# Patient Record
Sex: FEMALE | Race: WHITE | Employment: FULL TIME | URBAN - METROPOLITAN AREA
[De-identification: names, ages, dates, MRNs, and addresses within clinical notes are randomized per-mention and may not be internally consistent; named-entity substitution may affect disease eponyms.]

---

## 2017-02-07 ENCOUNTER — TELEPHONE (OUTPATIENT)
Dept: OBGYN CLINIC | Facility: CLINIC | Age: 37
End: 2017-02-07

## 2017-02-07 DIAGNOSIS — Z30.41 ENCOUNTER FOR BIRTH CONTROL PILLS MAINTENANCE: Primary | ICD-10-CM

## 2017-02-07 RX ORDER — NORGESTIMATE AND ETHINYL ESTRADIOL 7DAYSX3 28
1 KIT ORAL DAILY
Qty: 3 PACKAGE | Refills: 0 | Status: SHIPPED | OUTPATIENT
Start: 2017-02-07 | End: 2017-03-07

## 2017-02-07 NOTE — TELEPHONE ENCOUNTER
PER PT REQUESTING AN REFILL ON B/C PILLS / PT WOULD LIKE A CALL WHEN THE SCRIPT HAS BEEN SEND OVER / PLEASE ADVISE

## 2017-02-08 NOTE — TELEPHONE ENCOUNTER
3 packs sent to pharmacy. To have office visit in May. Looked like there was a year Rx sent at last May visit.   Please let pt know

## 2017-02-09 NOTE — TELEPHONE ENCOUNTER
Pt informed of rx, verbalized understanding.  Per pt she will call to make apt when it gets closer to May

## 2017-06-27 ENCOUNTER — OFFICE VISIT (OUTPATIENT)
Dept: OBGYN CLINIC | Facility: CLINIC | Age: 37
End: 2017-06-27

## 2017-06-27 VITALS
WEIGHT: 190 LBS | DIASTOLIC BLOOD PRESSURE: 64 MMHG | BODY MASS INDEX: 28.79 KG/M2 | SYSTOLIC BLOOD PRESSURE: 108 MMHG | HEIGHT: 68 IN

## 2017-06-27 DIAGNOSIS — Z30.41 ENCOUNTER FOR BIRTH CONTROL PILLS MAINTENANCE: ICD-10-CM

## 2017-06-27 DIAGNOSIS — Z01.419 WOMEN'S ANNUAL ROUTINE GYNECOLOGICAL EXAMINATION: Primary | ICD-10-CM

## 2017-06-27 DIAGNOSIS — Z11.3 ROUTINE SCREENING FOR STI (SEXUALLY TRANSMITTED INFECTION): ICD-10-CM

## 2017-06-27 DIAGNOSIS — E78.2 ELEVATED TRIGLYCERIDES WITH HIGH CHOLESTEROL: ICD-10-CM

## 2017-06-27 PROCEDURE — 99395 PREV VISIT EST AGE 18-39: CPT | Performed by: ADVANCED PRACTICE MIDWIFE

## 2017-06-27 RX ORDER — NORGESTIMATE AND ETHINYL ESTRADIOL 7DAYSX3 28
1 KIT ORAL DAILY
Qty: 3 PACKAGE | Refills: 3 | Status: SHIPPED | OUTPATIENT
Start: 2017-06-27 | End: 2018-06-14

## 2017-06-27 NOTE — PROGRESS NOTES
HPI:    Patient ID: April L Shad Gee is a 29year old female. No change in health in last year. Currently sexually active. Is a non smoker and  Drinks very little. Is using OCP but would like a BLTL in the future. Regular menses.        .Wt Readings fro Social History:   Smoking status: Former Smoker                                                              Packs/day: 0.00      Years: 0.00         Types: Cigarettes     Quit date: 3/1/2014  Smokeless tobacco: Never Used                      Alcohol us nipple discharge, no skin change and no tenderness. Left breast exhibits no inverted nipple, no mass, no nipple discharge, no skin change and no tenderness. Abdominal: Soft. She exhibits no distension and no mass. There is no tenderness.  There is no rebo Antigen      TREP      HIV AG AB Combo    Meds This Visit:  Signed Prescriptions Disp Refills    Norgestim-Eth Estrad Triphasic (TRINESSA, 28,) 0.18/0.215/0.25 MG-35 MCG Oral Tab 3 Package 3      Sig: Take 1 tablet by mouth daily.            Imaging & Refer

## 2017-07-15 ENCOUNTER — APPOINTMENT (OUTPATIENT)
Dept: LAB | Age: 37
End: 2017-07-15
Attending: ADVANCED PRACTICE MIDWIFE
Payer: COMMERCIAL

## 2017-07-15 DIAGNOSIS — Z01.419 WOMEN'S ANNUAL ROUTINE GYNECOLOGICAL EXAMINATION: ICD-10-CM

## 2017-07-15 DIAGNOSIS — Z11.3 ROUTINE SCREENING FOR STI (SEXUALLY TRANSMITTED INFECTION): ICD-10-CM

## 2017-07-15 LAB
ALBUMIN SERPL BCP-MCNC: 3.8 G/DL (ref 3.5–4.8)
ALBUMIN/GLOB SERPL: 1.2 {RATIO} (ref 1–2)
ALP SERPL-CCNC: 38 U/L (ref 32–100)
ALT SERPL-CCNC: 19 U/L (ref 14–54)
ANION GAP SERPL CALC-SCNC: 8 MMOL/L (ref 0–18)
AST SERPL-CCNC: 16 U/L (ref 15–41)
BILIRUB SERPL-MCNC: 0.6 MG/DL (ref 0.3–1.2)
BUN SERPL-MCNC: 13 MG/DL (ref 8–20)
BUN/CREAT SERPL: 15.1 (ref 10–20)
CALCIUM SERPL-MCNC: 9 MG/DL (ref 8.5–10.5)
CHLORIDE SERPL-SCNC: 102 MMOL/L (ref 95–110)
CHOLEST SERPL-MCNC: 217 MG/DL (ref 110–200)
CO2 SERPL-SCNC: 27 MMOL/L (ref 22–32)
CREAT SERPL-MCNC: 0.86 MG/DL (ref 0.5–1.5)
GLOBULIN PLAS-MCNC: 3.1 G/DL (ref 2.5–3.7)
GLUCOSE SERPL-MCNC: 82 MG/DL (ref 70–99)
HDLC SERPL-MCNC: 61 MG/DL
LDLC SERPL CALC-MCNC: 119 MG/DL (ref 0–99)
NONHDLC SERPL-MCNC: 156 MG/DL
OSMOLALITY UR CALC.SUM OF ELEC: 283 MOSM/KG (ref 275–295)
POTASSIUM SERPL-SCNC: 3.8 MMOL/L (ref 3.3–5.1)
PROT SERPL-MCNC: 6.9 G/DL (ref 5.9–8.4)
SODIUM SERPL-SCNC: 137 MMOL/L (ref 136–144)
TRIGL SERPL-MCNC: 187 MG/DL (ref 1–149)
TSH SERPL-ACNC: 1.67 UIU/ML (ref 0.45–5.33)

## 2017-07-15 PROCEDURE — 86780 TREPONEMA PALLIDUM: CPT

## 2017-07-15 PROCEDURE — 36415 COLL VENOUS BLD VENIPUNCTURE: CPT

## 2017-07-15 PROCEDURE — 80061 LIPID PANEL: CPT

## 2017-07-15 PROCEDURE — 80053 COMPREHEN METABOLIC PANEL: CPT

## 2017-07-15 PROCEDURE — 84443 ASSAY THYROID STIM HORMONE: CPT

## 2017-07-15 PROCEDURE — 87340 HEPATITIS B SURFACE AG IA: CPT

## 2017-07-15 PROCEDURE — 87389 HIV-1 AG W/HIV-1&-2 AB AG IA: CPT

## 2017-07-17 LAB
HBV SURFACE AG SERPL QL IA: NONREACTIVE
HIV1+2 AB SERPL QL IA: NONREACTIVE
T PALLIDUM AB SER QL: NEGATIVE

## 2017-07-27 ENCOUNTER — TELEPHONE (OUTPATIENT)
Dept: OBGYN CLINIC | Facility: CLINIC | Age: 37
End: 2017-07-27

## 2017-07-27 NOTE — TELEPHONE ENCOUNTER
Per the pt she already had all her testing done, and the results were sent to her on my chart. She would like to speak with a nurse. Please advise.

## 2017-07-27 NOTE — TELEPHONE ENCOUNTER
Pt voices she received a message from [facility] that some of her lab work was not completed so her Lipitor would not be refilled. Pt voices all her labs were done. Please advise.

## 2017-07-31 NOTE — TELEPHONE ENCOUNTER
SUNDAY [facility]  [facility] Clinical [facility]   Cc: Sydney Garcia, RN             Please clarify with patient that I finally received her Lipid profile and it was improved over the past. Yeni Roper was confusion as to the past message. Margi De Souza was referri

## 2017-08-01 NOTE — TELEPHONE ENCOUNTER
Message left on pt's voicemail that, per sjm, her lipid panel has improved since her last testing, and that she can continue to take her BCP this year, but she needs to see her pcp to review her lipid profile and determine if changes in the medication that

## 2018-02-13 NOTE — MR AVS SNAPSHOT
After Visit Summary   11/16/2020 April Estefania Delvalle    MRN: [id number]           Visit Information     Date & Time  11/16/2020  6:00 PM Provider  [facility]  Phone  [phone] If you receive a survey from [employer], please take a few minutes to complete it and provide feedback. We strive to deliver the best patient experience and are looking for ways to make improvements. Your feedback will help us do so.  For more information EMERGENCY ROOM Life-threatening emergencies needing immediate intervention at a hospital emergency room.  Average cost  $2,300*   *Cost varies based on your insurance coverage  For more information about hours, locations or appointment options available at medical co management

## 2018-06-14 DIAGNOSIS — Z30.41 ENCOUNTER FOR BIRTH CONTROL PILLS MAINTENANCE: ICD-10-CM

## 2018-06-18 RX ORDER — NORGESTIMATE-ETHINYL ESTRADIOL 7DAYSX3 28
1 TABLET ORAL DAILY
Qty: 84 TABLET | Refills: 0 | Status: SHIPPED | OUTPATIENT
Start: 2018-06-18 | End: 2018-07-16

## 2018-06-19 ENCOUNTER — OFFICE VISIT (OUTPATIENT)
Dept: OBGYN CLINIC | Facility: CLINIC | Age: 38
End: 2018-06-19

## 2018-06-19 VITALS
DIASTOLIC BLOOD PRESSURE: 76 MMHG | BODY MASS INDEX: 28.25 KG/M2 | HEART RATE: 76 BPM | HEIGHT: 68 IN | WEIGHT: 186.38 LBS | SYSTOLIC BLOOD PRESSURE: 118 MMHG

## 2018-06-19 DIAGNOSIS — Z01.419 ENCOUNTER FOR WELL WOMAN EXAM WITH ROUTINE GYNECOLOGICAL EXAM: Primary | ICD-10-CM

## 2018-06-19 PROCEDURE — 99395 PREV VISIT EST AGE 18-39: CPT | Performed by: ADVANCED PRACTICE MIDWIFE

## 2018-06-19 RX ORDER — NORGESTIMATE AND ETHINYL ESTRADIOL 7DAYSX3 28
1 KIT ORAL
Qty: 3 PACKAGE | Refills: 4 | Status: SHIPPED | OUTPATIENT
Start: 2018-06-19 | End: 2019-07-01

## 2018-06-19 NOTE — PROGRESS NOTES
HPI:    Patient ID: April L Lizzette Ge is a 40year old female. Here for annual and resupply of OCP. No plans for pregnancy. Denies vaginal d/c, itch or odor,  Reviewed PSFSHx no change. Review of Systems   Constitutional: Negative.     HENT: Tawana Sands rebound and no guarding. Genitourinary: Rectum normal, vagina normal and uterus normal. There is no rash, tenderness or lesion on the right labia. There is no rash, tenderness or lesion on the left labia.  Uterus is not deviated, not enlarged and not tend

## 2018-06-21 ENCOUNTER — TELEPHONE (OUTPATIENT)
Dept: OBGYN CLINIC | Facility: CLINIC | Age: 38
End: 2018-06-21

## 2018-06-21 NOTE — TELEPHONE ENCOUNTER
Pt. states that RN has called her twice about sched an appt. to get her yearly done, but pt. has already come in for her appt. on 6/19?

## 2018-09-09 DIAGNOSIS — Z30.41 ENCOUNTER FOR BIRTH CONTROL PILLS MAINTENANCE: ICD-10-CM

## 2018-09-10 RX ORDER — NORGESTIMATE-ETHINYL ESTRADIOL 7DAYSX3 28
TABLET ORAL
Qty: 84 TABLET | Refills: 0 | OUTPATIENT
Start: 2018-09-10

## 2019-06-14 ENCOUNTER — TELEPHONE (OUTPATIENT)
Dept: OBGYN CLINIC | Facility: CLINIC | Age: 39
End: 2019-06-14

## 2019-06-15 NOTE — TELEPHONE ENCOUNTER
Scheduled for annual on 7/1/19. RN placed call to patient to determine what labs she would like to complete.  Pt unavailable, LMTCB

## 2019-06-19 NOTE — TELEPHONE ENCOUNTER
Pt voices she would like to wait until her annual visit with bev on 07/1/2019 to discuss what labs she would like to have done.

## 2019-07-01 ENCOUNTER — OFFICE VISIT (OUTPATIENT)
Dept: OBGYN CLINIC | Facility: CLINIC | Age: 39
End: 2019-07-01
Payer: COMMERCIAL

## 2019-07-01 VITALS
SYSTOLIC BLOOD PRESSURE: 113 MMHG | HEART RATE: 76 BPM | DIASTOLIC BLOOD PRESSURE: 74 MMHG | BODY MASS INDEX: 29 KG/M2 | WEIGHT: 193 LBS

## 2019-07-01 DIAGNOSIS — Z30.41 ENCOUNTER FOR BIRTH CONTROL PILLS MAINTENANCE: ICD-10-CM

## 2019-07-01 DIAGNOSIS — Z01.419 WOMEN'S ANNUAL ROUTINE GYNECOLOGICAL EXAMINATION: Primary | ICD-10-CM

## 2019-07-01 PROCEDURE — 99395 PREV VISIT EST AGE 18-39: CPT | Performed by: ADVANCED PRACTICE MIDWIFE

## 2019-07-01 RX ORDER — NORGESTIMATE AND ETHINYL ESTRADIOL 7DAYSX3 28
1 KIT ORAL
Qty: 3 PACKAGE | Refills: 4 | Status: SHIPPED | OUTPATIENT
Start: 2019-07-01 | End: 2020-08-24 | Stop reason: ALTCHOICE

## 2019-07-01 NOTE — PROGRESS NOTES
HPI:    Patient ID: April L Sarabjit Lafleur is a 41year old female. Here for annual exam and lab work.       Reveiwed PMSFH no change    Is currently sexually active on OCP wants to continue    12 pt ROS documented      Review of Systems   Constitutional: Suyapa Johnson • ORAL SURGERY PROCEDURE  8/10    wisdom teeth      Family History   Problem Relation Age of Onset   • Diabetes Mother    • Heart Disorder Mother    • Lipids Mother    • Other (Other) Mother         diverticulosis   • Other (kidney) Mother         kidney nipple, no mass, no nipple discharge, no skin change and no tenderness. Left breast exhibits no inverted nipple, no mass, no nipple discharge, no skin change and no tenderness. Abdominal: Soft. She exhibits no distension and no mass.  There is no tenderne once daily.        Imaging & Referrals:  None       [id number]

## 2019-07-27 ENCOUNTER — APPOINTMENT (OUTPATIENT)
Dept: LAB | Age: 39
End: 2019-07-27
Attending: ADVANCED PRACTICE MIDWIFE
Payer: COMMERCIAL

## 2019-07-27 DIAGNOSIS — Z01.419 WOMEN'S ANNUAL ROUTINE GYNECOLOGICAL EXAMINATION: ICD-10-CM

## 2019-07-27 LAB
ALBUMIN SERPL-MCNC: 3.5 G/DL (ref 3.4–5)
ALBUMIN/GLOB SERPL: 1 {RATIO} (ref 1–2)
ALP LIVER SERPL-CCNC: 49 U/L (ref 37–98)
ALT SERPL-CCNC: 24 U/L (ref 13–56)
ANION GAP SERPL CALC-SCNC: 8 MMOL/L (ref 0–18)
AST SERPL-CCNC: 10 U/L (ref 15–37)
BILIRUB SERPL-MCNC: 0.3 MG/DL (ref 0.1–2)
BUN BLD-MCNC: 19 MG/DL (ref 7–18)
BUN/CREAT SERPL: 26 (ref 10–20)
CALCIUM BLD-MCNC: 8.9 MG/DL (ref 8.5–10.1)
CHLORIDE SERPL-SCNC: 107 MMOL/L (ref 98–112)
CHOLEST SMN-MCNC: 222 MG/DL (ref ?–200)
CO2 SERPL-SCNC: 26 MMOL/L (ref 21–32)
CREAT BLD-MCNC: 0.73 MG/DL (ref 0.55–1.02)
GLOBULIN PLAS-MCNC: 3.6 G/DL (ref 2.8–4.4)
GLUCOSE BLD-MCNC: 71 MG/DL (ref 70–99)
HDLC SERPL-MCNC: 60 MG/DL (ref 40–59)
LDLC SERPL CALC-MCNC: 129 MG/DL (ref ?–100)
M PROTEIN MFR SERPL ELPH: 7.1 G/DL (ref 6.4–8.2)
NONHDLC SERPL-MCNC: 162 MG/DL (ref ?–130)
OSMOLALITY SERPL CALC.SUM OF ELEC: 293 MOSM/KG (ref 275–295)
PATIENT FASTING: YES
PATIENT FASTING: YES
POTASSIUM SERPL-SCNC: 4.1 MMOL/L (ref 3.5–5.1)
SODIUM SERPL-SCNC: 141 MMOL/L (ref 136–145)
TRIGL SERPL-MCNC: 167 MG/DL (ref 30–149)
TSI SER-ACNC: 0.88 MIU/ML (ref 0.36–3.74)
VLDLC SERPL CALC-MCNC: 33 MG/DL (ref 0–30)

## 2019-07-27 PROCEDURE — 36415 COLL VENOUS BLD VENIPUNCTURE: CPT

## 2019-07-27 PROCEDURE — 84443 ASSAY THYROID STIM HORMONE: CPT

## 2019-07-27 PROCEDURE — 80053 COMPREHEN METABOLIC PANEL: CPT

## 2019-07-27 PROCEDURE — 80061 LIPID PANEL: CPT

## 2020-08-24 PROBLEM — Z28.21 IMMUNIZATION REFUSED: Status: ACTIVE | Noted: 2020-08-24

## 2020-10-11 DIAGNOSIS — Z30.41 ENCOUNTER FOR BIRTH CONTROL PILLS MAINTENANCE: ICD-10-CM

## 2020-10-13 RX ORDER — NORGESTIMATE AND ETHINYL ESTRADIOL 7DAYSX3 28
KIT ORAL
Qty: 84 TABLET | Refills: 0 | OUTPATIENT
Start: 2020-10-13

## 2020-10-13 NOTE — TELEPHONE ENCOUNTER
Patient called stating she is needing a refill on her [facility] before this Friday when [place]. The pharmacy just informed her it was denied. Agusto Wilcox told her she did not need an annual every year according to the patient.  Please advise

## 2020-10-14 NOTE — TELEPHONE ENCOUNTER
Patient calling back to follow up on [facility].  Going out of town this Friday and needs at least one refill until she can come in for a pap/pelvic

## 2020-10-15 ENCOUNTER — TELEPHONE (OUTPATIENT)
Dept: OBGYN CLINIC | Facility: CLINIC | Age: 40
End: 2020-10-15

## 2020-10-15 RX ORDER — NORGESTIMATE AND ETHINYL ESTRADIOL 7DAYSX3 28
1 KIT ORAL DAILY
Qty: 28 TABLET | Refills: 0 | Status: SHIPPED | OUTPATIENT
Start: 2020-10-15 | End: 2020-11-12

## 2020-10-15 NOTE — TELEPHONE ENCOUNTER
1 pack of 28 pills only sent. Pt to come for annual and pap before more Rx will be sent.   She can see me or Venancio Mor

## 2020-11-16 ENCOUNTER — OFFICE VISIT (OUTPATIENT)
Dept: OBGYN CLINIC | Facility: CLINIC | Age: 40
End: 2020-11-16
Payer: COMMERCIAL

## 2020-11-16 VITALS — WEIGHT: 194 LBS | SYSTOLIC BLOOD PRESSURE: 118 MMHG | BODY MASS INDEX: 30 KG/M2 | DIASTOLIC BLOOD PRESSURE: 72 MMHG

## 2020-11-16 DIAGNOSIS — Z01.419 ENCOUNTER FOR ANNUAL ROUTINE GYNECOLOGICAL EXAMINATION: Primary | ICD-10-CM

## 2020-11-16 DIAGNOSIS — Z30.41 ENCOUNTER FOR BIRTH CONTROL PILLS MAINTENANCE: ICD-10-CM

## 2020-11-16 PROCEDURE — 99395 PREV VISIT EST AGE 18-39: CPT | Performed by: ADVANCED PRACTICE MIDWIFE

## 2020-11-16 PROCEDURE — 3074F SYST BP LT 130 MM HG: CPT | Performed by: ADVANCED PRACTICE MIDWIFE

## 2020-11-16 PROCEDURE — 3078F DIAST BP <80 MM HG: CPT | Performed by: ADVANCED PRACTICE MIDWIFE

## 2020-11-16 PROCEDURE — 99072 ADDL SUPL MATRL&STAF TM PHE: CPT | Performed by: ADVANCED PRACTICE MIDWIFE

## 2020-11-16 RX ORDER — NORGESTIMATE AND ETHINYL ESTRADIOL 7DAYSX3 28
1 KIT ORAL DAILY
Qty: 3 PACKAGE | Refills: 5 | Status: SHIPPED | OUTPATIENT
Start: 2020-11-16 | End: 2020-12-14

## 2020-11-17 NOTE — PROGRESS NOTES
HPI:    Patient ID: April L Sendy Delong is a 45year old female. Here for annual exam.  Needs a refill of OCP. Is due for pap smear. Has no change in partner.       [facility] reviewed and updated started on Alprazalam for anxiety after mother's death    12 pt Results   Component Value Date    New Britta Cervix/Endocervical 01/28/2015     Lab Results   Component Value Date    HPV1 Negative 01/28/2015       Current Outpatient Medications   Medication Sig Dispense Refill   • Norgestim-Eth Estrad Triphasic (TRI-SPRINTE Allergies:  Novocain [Procaine]     PALPITATIONS   PHYSICAL EXAM:   Physical Exam   Constitutional: She is oriented to person, place, and time. She appears well-developed and well-nourished. No distress. HENT:   Head: Normocephalic and atraumatic. affect. Her behavior is normal. Thought content normal.   Nursing note and vitals reviewed.              ASSESSMENT/PLAN:   Encounter for annual routine gynecological examination  (primary encounter diagnosis)  Encounter for birth control pills maintenance

## 2020-11-19 ENCOUNTER — PATIENT MESSAGE (OUTPATIENT)
Dept: OBGYN CLINIC | Facility: CLINIC | Age: 40
End: 2020-11-19

## 2020-11-23 RX ORDER — METRONIDAZOLE 500 MG/1
500 TABLET ORAL 2 TIMES DAILY
Qty: 14 TABLET | Refills: 0 | Status: SHIPPED | OUTPATIENT
Start: 2020-11-23 | End: 2020-11-30

## 2020-11-24 NOTE — TELEPHONE ENCOUNTER
From: CELINA Caro  To: April Tenzin Gilmore  Sent: 11/19/2020 6:25 PM CST  Subject: pap    Your pap smear is normal but does show a shift to some bacteria. This usually indicates a bacterial infection in the vagina.  I can send a prescription for a vagin

## 2022-03-19 RX ORDER — NORGESTIMATE AND ETHINYL ESTRADIOL 7DAYSX3 28
KIT ORAL
Qty: 28 TABLET | Refills: 0 | OUTPATIENT
Start: 2022-03-19

## 2022-03-25 ENCOUNTER — TELEPHONE (OUTPATIENT)
Dept: OBGYN CLINIC | Facility: CLINIC | Age: 42
End: 2022-03-25

## 2022-03-25 NOTE — TELEPHONE ENCOUNTER
Contacted pharmacy and informed patient has not been seen since 11/2020 and refills have been ordered through PCP.

## 2022-03-25 NOTE — TELEPHONE ENCOUNTER
Jacque Valladares, CELINA Fatima! This patient had a well woman exam with her primary care provider who appears to have placed the last refill for this medication in 2021. She has not been seen in this clinic since 2020. She can inquire about refills with her primary care provider.

## 2023-03-17 NOTE — TELEPHONE ENCOUNTER
Left message on pt's voicemail that, per sjm, she needs to schedule her yearly annual appointment. Pt can call [phone] to schedule that appointment. MEDICATION NEEDS TO BE SENT TO PHARMACY PROVIDED.    [facility] # - [place], [place] - [address] - [phone] [place] [phone] FX  [phone]

## (undated) NOTE — LETTER
6/30/2017 April [id number]         Dear April,    It was a pleasure to see you. Your PAP test and HPV culture were normal.  There is no need for further testing at this time.   I look f